# Patient Record
Sex: FEMALE | Race: OTHER | NOT HISPANIC OR LATINO | URBAN - METROPOLITAN AREA
[De-identification: names, ages, dates, MRNs, and addresses within clinical notes are randomized per-mention and may not be internally consistent; named-entity substitution may affect disease eponyms.]

---

## 2017-07-13 ENCOUNTER — EMERGENCY (EMERGENCY)
Facility: HOSPITAL | Age: 37
LOS: 1 days | Discharge: HOME | End: 2017-07-13
Attending: EMERGENCY MEDICINE | Admitting: EMERGENCY MEDICINE
Payer: COMMERCIAL

## 2017-07-13 VITALS
DIASTOLIC BLOOD PRESSURE: 87 MMHG | SYSTOLIC BLOOD PRESSURE: 135 MMHG | HEART RATE: 89 BPM | OXYGEN SATURATION: 98 % | RESPIRATION RATE: 18 BRPM

## 2017-07-13 VITALS
WEIGHT: 179.24 LBS | SYSTOLIC BLOOD PRESSURE: 147 MMHG | RESPIRATION RATE: 20 BRPM | HEART RATE: 88 BPM | DIASTOLIC BLOOD PRESSURE: 84 MMHG | TEMPERATURE: 98 F | OXYGEN SATURATION: 100 %

## 2017-07-13 DIAGNOSIS — J45.901 UNSPECIFIED ASTHMA WITH (ACUTE) EXACERBATION: ICD-10-CM

## 2017-07-13 DIAGNOSIS — Z79.899 OTHER LONG TERM (CURRENT) DRUG THERAPY: ICD-10-CM

## 2017-07-13 DIAGNOSIS — R07.89 OTHER CHEST PAIN: ICD-10-CM

## 2017-07-13 PROCEDURE — 99284 EMERGENCY DEPT VISIT MOD MDM: CPT | Mod: 25

## 2017-07-13 PROCEDURE — 94640 AIRWAY INHALATION TREATMENT: CPT

## 2017-07-13 PROCEDURE — 99284 EMERGENCY DEPT VISIT MOD MDM: CPT

## 2017-07-13 RX ORDER — IPRATROPIUM/ALBUTEROL SULFATE 18-103MCG
3 AEROSOL WITH ADAPTER (GRAM) INHALATION ONCE
Qty: 0 | Refills: 0 | Status: COMPLETED | OUTPATIENT
Start: 2017-07-13 | End: 2017-07-13

## 2017-07-13 RX ORDER — ALBUTEROL 90 UG/1
1 AEROSOL, METERED ORAL ONCE
Qty: 0 | Refills: 0 | Status: COMPLETED | OUTPATIENT
Start: 2017-07-13 | End: 2017-07-13

## 2017-07-13 RX ORDER — ALBUTEROL 90 UG/1
2 AEROSOL, METERED ORAL
Qty: 1 | Refills: 0 | OUTPATIENT
Start: 2017-07-13 | End: 2017-08-12

## 2017-07-13 RX ADMIN — Medication 3 MILLILITER(S): at 21:11

## 2017-07-13 RX ADMIN — Medication 40 MILLIGRAM(S): at 21:11

## 2017-07-13 RX ADMIN — Medication 3 MILLILITER(S): at 20:33

## 2017-07-13 RX ADMIN — ALBUTEROL 1 PUFF(S): 90 AEROSOL, METERED ORAL at 21:52

## 2017-07-13 NOTE — ED PROVIDER NOTE - OBJECTIVE STATEMENT
37 y/o f hx asthma never intubated presents c/o chest tightness, wheezing x 2 days.  Pt stating the humid weather exacerbated her symptoms, she ran out of medicine in her inhaler.  Denies fever, CP, SOB, all other ROS negative.

## 2017-07-13 NOTE — ED PROVIDER NOTE - ATTENDING CONTRIBUTION TO CARE
37 yo F with hx of asthma- no prior intubations- with mld asthma exacerbation  x 1 day-  no fevers or resp distress - clinically improved in ED with nebs and steroids -  stable for dc

## 2017-07-13 NOTE — ED ADULT NURSE NOTE - OBJECTIVE STATEMENT
Pt walked into ED with c/o of SOB with a cough with sputum. Pt. states onset was 2 days ago. Denies taking anything for the SOB. Pt. has hx of asthma. Pt. denies fever, chills.

## 2017-07-13 NOTE — ED ADULT TRIAGE NOTE - CHIEF COMPLAINT QUOTE
Pt c/o asthma exacerbation , states she lost her rescue inhaler, current smoker. . No acute resp distress, speaking in full sentences.

## 2017-07-13 NOTE — ED PROVIDER NOTE - MEDICAL DECISION MAKING DETAILS
35 y/o f asthma exacerbation; mild wheezing on exam, given nebs, steroids with improvement.  Wheezing resolved, pt breathing comfortably, given albuterol inhaler, d/c rx albuterol and prednisone

## 2017-07-13 NOTE — ED ADULT NURSE NOTE - CHPI ED SYMPTOMS NEG
no hemoptysis/no chest pain/no wheezing/no body aches/no diaphoresis/no chills/no edema/no fever/no headache

## 2017-09-09 ENCOUNTER — EMERGENCY (EMERGENCY)
Facility: HOSPITAL | Age: 37
LOS: 1 days | Discharge: HOME | End: 2017-09-09
Attending: EMERGENCY MEDICINE | Admitting: EMERGENCY MEDICINE
Payer: COMMERCIAL

## 2017-09-09 VITALS
HEIGHT: 69 IN | TEMPERATURE: 98 F | DIASTOLIC BLOOD PRESSURE: 83 MMHG | RESPIRATION RATE: 16 BRPM | OXYGEN SATURATION: 98 % | HEART RATE: 97 BPM | SYSTOLIC BLOOD PRESSURE: 124 MMHG

## 2017-09-09 DIAGNOSIS — Z23 ENCOUNTER FOR IMMUNIZATION: ICD-10-CM
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
## 2017-09-09 DIAGNOSIS — S61.012A LACERATION WITHOUT FOREIGN BODY OF LEFT THUMB WITHOUT DAMAGE TO NAIL, INITIAL ENCOUNTER: ICD-10-CM
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
## 2017-09-09 DIAGNOSIS — J45.909 UNSPECIFIED ASTHMA, UNCOMPLICATED: ICD-10-CM
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
## 2017-09-09 PROCEDURE — 99283 EMERGENCY DEPT VISIT LOW MDM: CPT

## 2017-09-09 PROCEDURE — 90471 IMMUNIZATION ADMIN: CPT

## 2017-09-09 PROCEDURE — 90715 TDAP VACCINE 7 YRS/> IM: CPT

## 2017-09-09 PROCEDURE — 99283 EMERGENCY DEPT VISIT LOW MDM: CPT | Mod: 25

## 2017-09-09 RX ORDER — IBUPROFEN 200 MG
600 TABLET ORAL ONCE
Qty: 0 | Refills: 0 | Status: COMPLETED | OUTPATIENT
Start: 2017-09-09 | End: 2017-09-09

## 2017-09-09 RX ORDER — TETANUS TOXOID, REDUCED DIPHTHERIA TOXOID AND ACELLULAR PERTUSSIS VACCINE, ADSORBED 5; 2.5; 8; 8; 2.5 [IU]/.5ML; [IU]/.5ML; UG/.5ML; UG/.5ML; UG/.5ML
0.5 SUSPENSION INTRAMUSCULAR ONCE
Qty: 0 | Refills: 0 | Status: COMPLETED | OUTPATIENT
Start: 2017-09-09 | End: 2017-09-09

## 2017-09-09 RX ADMIN — Medication 600 MILLIGRAM(S): at 15:03

## 2017-09-09 RX ADMIN — TETANUS TOXOID, REDUCED DIPHTHERIA TOXOID AND ACELLULAR PERTUSSIS VACCINE, ADSORBED 0.5 MILLILITER(S): 5; 2.5; 8; 8; 2.5 SUSPENSION INTRAMUSCULAR at 15:03

## 2017-09-09 NOTE — ED PROVIDER NOTE - MEDICAL DECISION MAKING DETAILS
Vitals stable. Wound stopped bleeding. Wound is clean, no periwound erythema, no edema, no clinical signs of infection. Laceration is superficial, no need for sutures. Tetanus status unknown. Tdap booster given. Ibuprofen given for pain. Applied betadine to clean the wound, bacitracin and bandaid. Instructed pt to apply bacitracin and bandaid daily. Pt is safe for d/c

## 2017-09-09 NOTE — ED PROVIDER NOTE - CARE PLAN
Principal Discharge DX:	Laceration of left thumb without foreign body without damage to nail, initial encounter

## 2017-09-09 NOTE — ED PROVIDER NOTE - ATTENDING CONTRIBUTION TO CARE
pt w L thumb laceration, superficial from clean razor as attempting to get it out of packet, bleeding resolved, no s/s of infection, no sutures necessary at this time.  advised on hygiene and bacitracin. tetanus utd as pt unsure.

## 2017-09-09 NOTE — ED PROVIDER NOTE - OBJECTIVE STATEMENT
37 yo f with PMH of asthma presents with a laceration on her left thumb after cutting her finger with a razor. Pt states this happened around 10 am. Pt states the bleeding has subsided. Pt states she is still experiencing pain. 35 yo f with PMH of asthma presents with a laceration on her left thumb after cutting her finger with a razor. Pt states this happened around 10 am. Pt states the bleeding has subsided. Pt states she is still experiencing pain, rates pain 5/10 on VAS. Pt unsure of Tetanus status. Pt denies n/v/f/sob.

## 2018-01-30 ENCOUNTER — EMERGENCY (EMERGENCY)
Facility: HOSPITAL | Age: 38
LOS: 1 days | Discharge: HOME | End: 2018-01-30
Attending: EMERGENCY MEDICINE | Admitting: EMERGENCY MEDICINE
Payer: COMMERCIAL

## 2018-01-30 VITALS
WEIGHT: 149.91 LBS | SYSTOLIC BLOOD PRESSURE: 128 MMHG | OXYGEN SATURATION: 100 % | DIASTOLIC BLOOD PRESSURE: 83 MMHG | HEIGHT: 65 IN | RESPIRATION RATE: 17 BRPM | TEMPERATURE: 97 F | HEART RATE: 84 BPM

## 2018-01-30 DIAGNOSIS — K29.70 GASTRITIS, UNSPECIFIED, WITHOUT BLEEDING: ICD-10-CM

## 2018-01-30 DIAGNOSIS — R46.0 VERY LOW LEVEL OF PERSONAL HYGIENE: ICD-10-CM

## 2018-01-30 DIAGNOSIS — R10.9 UNSPECIFIED ABDOMINAL PAIN: ICD-10-CM

## 2018-01-30 LAB
ALBUMIN SERPL ELPH-MCNC: 4.3 G/DL — SIGNIFICANT CHANGE UP (ref 3.3–5)
ALP SERPL-CCNC: 85 U/L — SIGNIFICANT CHANGE UP (ref 40–120)
ALT FLD-CCNC: 18 U/L — SIGNIFICANT CHANGE UP (ref 10–45)
ANION GAP SERPL CALC-SCNC: 16 MMOL/L — SIGNIFICANT CHANGE UP (ref 5–17)
AST SERPL-CCNC: 23 U/L — SIGNIFICANT CHANGE UP (ref 10–40)
BASOPHILS NFR BLD AUTO: 0.2 % — SIGNIFICANT CHANGE UP (ref 0–2)
BILIRUB SERPL-MCNC: 0.8 MG/DL — SIGNIFICANT CHANGE UP (ref 0.2–1.2)
BUN SERPL-MCNC: 11 MG/DL — SIGNIFICANT CHANGE UP (ref 7–23)
CALCIUM SERPL-MCNC: 9.1 MG/DL — SIGNIFICANT CHANGE UP (ref 8.4–10.5)
CHLORIDE SERPL-SCNC: 100 MMOL/L — SIGNIFICANT CHANGE UP (ref 96–108)
CO2 SERPL-SCNC: 25 MMOL/L — SIGNIFICANT CHANGE UP (ref 22–31)
CREAT SERPL-MCNC: 0.72 MG/DL — SIGNIFICANT CHANGE UP (ref 0.5–1.3)
EOSINOPHIL NFR BLD AUTO: 1.2 % — SIGNIFICANT CHANGE UP (ref 0–6)
ETHANOL SERPL-MCNC: <10 MG/DL — SIGNIFICANT CHANGE UP (ref 0–10)
GLUCOSE SERPL-MCNC: 135 MG/DL — HIGH (ref 70–99)
HCT VFR BLD CALC: 38.2 % — SIGNIFICANT CHANGE UP (ref 34.5–45)
HGB BLD-MCNC: 12.8 G/DL — SIGNIFICANT CHANGE UP (ref 11.5–15.5)
LIDOCAIN IGE QN: 18 U/L — SIGNIFICANT CHANGE UP (ref 7–60)
LYMPHOCYTES # BLD AUTO: 7.2 % — LOW (ref 13–44)
MCHC RBC-ENTMCNC: 28.8 PG — SIGNIFICANT CHANGE UP (ref 27–34)
MCHC RBC-ENTMCNC: 33.5 G/DL — SIGNIFICANT CHANGE UP (ref 32–36)
MCV RBC AUTO: 85.8 FL — SIGNIFICANT CHANGE UP (ref 80–100)
MONOCYTES NFR BLD AUTO: 3.9 % — SIGNIFICANT CHANGE UP (ref 2–14)
NEUTROPHILS NFR BLD AUTO: 87.5 % — HIGH (ref 43–77)
PCP SPEC-MCNC: SIGNIFICANT CHANGE UP
PLATELET # BLD AUTO: 149 K/UL — LOW (ref 150–400)
POTASSIUM SERPL-MCNC: 3.9 MMOL/L — SIGNIFICANT CHANGE UP (ref 3.5–5.3)
POTASSIUM SERPL-SCNC: 3.9 MMOL/L — SIGNIFICANT CHANGE UP (ref 3.5–5.3)
PROT SERPL-MCNC: 8.1 G/DL — SIGNIFICANT CHANGE UP (ref 6–8.3)
RBC # BLD: 4.45 M/UL — SIGNIFICANT CHANGE UP (ref 3.8–5.2)
RBC # FLD: 13.3 % — SIGNIFICANT CHANGE UP (ref 10.3–16.9)
SODIUM SERPL-SCNC: 141 MMOL/L — SIGNIFICANT CHANGE UP (ref 135–145)
WBC # BLD: 18.9 K/UL — HIGH (ref 3.8–10.5)
WBC # FLD AUTO: 18.9 K/UL — HIGH (ref 3.8–10.5)

## 2018-01-30 PROCEDURE — 36415 COLL VENOUS BLD VENIPUNCTURE: CPT

## 2018-01-30 PROCEDURE — 96374 THER/PROPH/DIAG INJ IV PUSH: CPT

## 2018-01-30 PROCEDURE — 83690 ASSAY OF LIPASE: CPT

## 2018-01-30 PROCEDURE — 80053 COMPREHEN METABOLIC PANEL: CPT

## 2018-01-30 PROCEDURE — 80307 DRUG TEST PRSMV CHEM ANLYZR: CPT

## 2018-01-30 PROCEDURE — 99284 EMERGENCY DEPT VISIT MOD MDM: CPT | Mod: 25

## 2018-01-30 PROCEDURE — 99284 EMERGENCY DEPT VISIT MOD MDM: CPT

## 2018-01-30 PROCEDURE — 96375 TX/PRO/DX INJ NEW DRUG ADDON: CPT

## 2018-01-30 PROCEDURE — 85025 COMPLETE CBC W/AUTO DIFF WBC: CPT

## 2018-01-30 RX ORDER — FAMOTIDINE 10 MG/ML
1 INJECTION INTRAVENOUS
Qty: 14 | Refills: 0 | OUTPATIENT
Start: 2018-01-30 | End: 2018-02-05

## 2018-01-30 RX ORDER — SODIUM CHLORIDE 9 MG/ML
1000 INJECTION INTRAMUSCULAR; INTRAVENOUS; SUBCUTANEOUS ONCE
Qty: 0 | Refills: 0 | Status: COMPLETED | OUTPATIENT
Start: 2018-01-30 | End: 2018-01-30

## 2018-01-30 RX ORDER — FAMOTIDINE 10 MG/ML
20 INJECTION INTRAVENOUS ONCE
Qty: 0 | Refills: 0 | Status: COMPLETED | OUTPATIENT
Start: 2018-01-30 | End: 2018-01-30

## 2018-01-30 RX ORDER — ONDANSETRON 8 MG/1
1 TABLET, FILM COATED ORAL
Qty: 9 | Refills: 0 | OUTPATIENT
Start: 2018-01-30 | End: 2018-02-01

## 2018-01-30 RX ORDER — LIDOCAINE 4 G/100G
10 CREAM TOPICAL ONCE
Qty: 0 | Refills: 0 | Status: COMPLETED | OUTPATIENT
Start: 2018-01-30 | End: 2018-01-30

## 2018-01-30 RX ORDER — KETOROLAC TROMETHAMINE 30 MG/ML
30 SYRINGE (ML) INJECTION ONCE
Qty: 0 | Refills: 0 | Status: DISCONTINUED | OUTPATIENT
Start: 2018-01-30 | End: 2018-01-30

## 2018-01-30 RX ORDER — METOCLOPRAMIDE HCL 10 MG
10 TABLET ORAL ONCE
Qty: 0 | Refills: 0 | Status: COMPLETED | OUTPATIENT
Start: 2018-01-30 | End: 2018-01-30

## 2018-01-30 RX ADMIN — Medication 30 MILLILITER(S): at 15:56

## 2018-01-30 RX ADMIN — SODIUM CHLORIDE 1000 MILLILITER(S): 9 INJECTION INTRAMUSCULAR; INTRAVENOUS; SUBCUTANEOUS at 15:57

## 2018-01-30 RX ADMIN — FAMOTIDINE 20 MILLIGRAM(S): 10 INJECTION INTRAVENOUS at 15:56

## 2018-01-30 RX ADMIN — Medication 10 MILLIGRAM(S): at 15:56

## 2018-01-30 RX ADMIN — LIDOCAINE 10 MILLILITER(S): 4 CREAM TOPICAL at 15:56

## 2018-01-30 RX ADMIN — Medication 30 MILLIGRAM(S): at 17:10

## 2018-01-30 NOTE — ED PROVIDER NOTE - MEDICAL DECISION MAKING DETAILS
pt c/o n/v and abd pain, highly inappropriate behavior but directable / follows commands, benign abd on serial exams, pt tolerating po, improved w/gi cocktail and antiemetics, leukocytosis likely stress reaction to emesis, no concern for cholecystitis or appy, no indication for any emergent imaging at this time, advised to stop drug use and f/u w/gi, diet modifications discussed at length

## 2018-01-30 NOTE — ED PROVIDER NOTE - ATTENDING CONTRIBUTION TO CARE
37 year old female presents for abdominal pain.  She is a markedly poor historian, states she has a history of gastritis.  +etoh intake. On exam, she appears uncomfortable but abdomen is soft, non tender, non distended.  there is no tenderness to palpation in RUQ or RLQ.   Observed sleeping after maalox and antiemetics, abdomen remains soft, non tender on repeat exams done both by myself and PA.

## 2018-01-30 NOTE — ED PROVIDER NOTE - PSYCHIATRIC, MLM
Alert and oriented to person, place, time/situation. inappropriate affect, disruptive - screaming and moaning however stops when asked to

## 2018-01-30 NOTE — ED STATDOCS - OBJECTIVE STATEMENT
The pt is a 36 y/o F, BIBA, c/o n/v and abd pain today. Pt poor historian, pain "all over", 10/10, has not taken any pain meds, + n/v - non bloody, non bilious, unable to quantify, does not recall last po intake. Denies fevers, chills, cp, sob, diarrhea, anorexia, alcohol use

## 2018-01-30 NOTE — ED ADULT NURSE NOTE - OBJECTIVE STATEMENT
Patient BIBA, AAOx3, in NAD, vitals stable, complaining of 10/10 abdominal pain, nausea and vomiting which started at 1:00PM today.  Patient denies any fevers, chills, diarrhea, dizziness, or any other complaints.  PIV placed, labs drawn and sent.  Will continue with plan of care.

## 2018-04-01 NOTE — ED ADULT NURSE NOTE - NS ED NURSE RECORD ANOTHER VITAL SIGN
Yes Color consistent with ethnicity/race, warm, dry intact, resilient. Color consistent with ethnicity/race, warm, dry intact, resilient.

## 2019-03-21 NOTE — ED PROVIDER NOTE - OBJECTIVE STATEMENT
Continued Stay Note  [facility]     Patient Name: Gerson Wilhelm  MRN: [id number]  Today's Date: 3/21/2019    Admit Date: 3/11/2019    Discharge Plan     Row Name 03/21/19 1121       Plan    Plan  Dr. Patel is accepting MD at [facility] (#[phone]).  MARION Gregory.     Patient/Family in Agreement with Plan  yes        Discharge Codes    No documentation.             MARION Andre     The pt is a 38 y/o F, BIBA, c/o n/v and abd pain today. Pt poor historian, pain "all over", 10/10, has not taken any pain meds, + n/v - non bloody, non bilious, unable to quantify, does not recall last po intake. Denies fevers, chills, cp, sob, diarrhea, anorexia, alcohol use

## 2019-05-06 NOTE — ED STATDOCS - NS ED NOTE AC HIGH RISK COUNTRIES
Headaches are unchanged.  Advised to keep a headache diary.  Counseled regarding lifestyle modifications.  Medication changes per orders.  Avoid NSAID, resume Gabapentin at bed time.     No
